# Patient Record
Sex: MALE | Race: WHITE | NOT HISPANIC OR LATINO | Employment: OTHER | URBAN - METROPOLITAN AREA
[De-identification: names, ages, dates, MRNs, and addresses within clinical notes are randomized per-mention and may not be internally consistent; named-entity substitution may affect disease eponyms.]

---

## 2018-05-30 ENCOUNTER — APPOINTMENT (OUTPATIENT)
Dept: RADIOLOGY | Facility: CLINIC | Age: 83
End: 2018-05-30
Payer: COMMERCIAL

## 2018-05-30 VITALS — DIASTOLIC BLOOD PRESSURE: 63 MMHG | SYSTOLIC BLOOD PRESSURE: 114 MMHG | WEIGHT: 173 LBS | HEART RATE: 60 BPM

## 2018-05-30 DIAGNOSIS — M25.551 PAIN IN RIGHT HIP: ICD-10-CM

## 2018-05-30 DIAGNOSIS — M16.11 PRIMARY LOCALIZED OSTEOARTHRITIS OF RIGHT HIP: Primary | ICD-10-CM

## 2018-05-30 DIAGNOSIS — S76.011A HIP STRAIN, RIGHT, INITIAL ENCOUNTER: ICD-10-CM

## 2018-05-30 PROCEDURE — 99203 OFFICE O/P NEW LOW 30 MIN: CPT | Performed by: ORTHOPAEDIC SURGERY

## 2018-05-30 PROCEDURE — 73502 X-RAY EXAM HIP UNI 2-3 VIEWS: CPT

## 2018-05-30 RX ORDER — CLOPIDOGREL BISULFATE 75 MG/1
TABLET ORAL EVERY 24 HOURS
COMMUNITY

## 2018-05-30 RX ORDER — METOPROLOL SUCCINATE 25 MG/1
TABLET, EXTENDED RELEASE ORAL EVERY 24 HOURS
COMMUNITY

## 2018-05-30 RX ORDER — ATORVASTATIN CALCIUM 40 MG/1
40 TABLET, FILM COATED ORAL
COMMUNITY

## 2018-05-30 NOTE — PROGRESS NOTES
Assessment:     1  Primary localized osteoarthritis of right hip    2  Hip strain, right, initial encounter        Plan:     Problem List Items Addressed This Visit        Musculoskeletal and Integument    Primary localized osteoarthritis of right hip - Primary    Relevant Orders    XR hip/pelv 2-3 vws right if performed    Ambulatory referral to Physical Therapy    Hip strain, right, initial encounter    Relevant Orders    Ambulatory referral to Physical Therapy          The patient was seen and examined by Dr Britt Menendez and myself  Findings consistent with right hip osteoarthritis and strain  Findings and treatment options were discussed the patient  Patient has no pain at this time, but he was given a prescription for physical therapy to use if pain returns to help with muscle strengthening  He is to take NSAIDs if the pain returns as well  Follow-up as needed if pain returns  All questions were answered to patient's satisfaction  Subjective:     Patient ID: Lynne Walton is a 80 y o  male  Chief Complaint: This is an 66-year-old male complaining of right hip pain for the past 3 months  He denies any injury or change in activities  The pain started gradually  He feels most of the pain when he is walking for prolonged periods of time  He denies any stiffness  The pain was localized over the lateral aspect of his hip and right lumbar spine  He denies any groin pain or pain, numbness or tingling radiating down his right lower extremity  The pain resolved about 2 weeks ago  He has a history of a different type of right hip pain in August 2017  He was seen at the [facility] an MRI of the right hip was done that showed a tendon tear  He did not bring the MRI with him to the appointment today  No recent treatment  Patient intake form was reviewed today         Allergy:  No Known Allergies  Medications:  all current active meds have been reviewed  Past Medical History:  Past Medical History:   Diagnosis Date    Hypertension      Past Surgical History:  History reviewed  No pertinent surgical history  Family History:  Family History   Problem Relation Age of Onset    No Known Problems Mother     No Known Problems Father      Social History:  History   Alcohol use Not on file     History   Drug use: Unknown     History   Smoking Status    Never Smoker   Smokeless Tobacco    Never Used     Review of Systems   Constitutional: Negative  HENT: Negative  Eyes: Negative  Respiratory: Negative  Cardiovascular: Negative  Gastrointestinal: Negative  Endocrine: Negative  Genitourinary: Negative  Musculoskeletal: Negative  Skin: Negative  Allergic/Immunologic: Negative  Neurological: Negative  Hematological: Negative  Psychiatric/Behavioral: Positive for dysphoric mood  The patient is nervous/anxious  Objective:  BP Readings from Last 1 Encounters:   05/30/18 114/63      Wt Readings from Last 1 Encounters:   05/30/18 78 5 kg (173 lb)      BMI:   There is no height or weight on file to calculate BMI  BSA:   There is no height or weight on file to calculate BSA  Physical Exam   Constitutional: He is oriented to person, place, and time  He appears well-developed  HENT:   Head: Normocephalic and atraumatic  Eyes: EOM are normal  Pupils are equal, round, and reactive to light  Neck: Neck supple  Neurological: He is alert and oriented to person, place, and time  Skin: Skin is warm  Psychiatric: He has a normal mood and affect  Nursing note and vitals reviewed  Right Hip Exam     Tenderness   The patient is experiencing no tenderness  Range of Motion   The patient has normal right hip ROM  Muscle Strength   The patient has normal right hip strength      Tests   NENA: negative  John: negative    Other   Erythema: absent  Scars: absent  Sensation: normal  Pulse: present    Comments:  No pain with active or passive range of motion  No pain with resisted strength testing      Left Hip Exam   Left hip exam is normal       Back Exam     Tenderness   The patient is experiencing no tenderness  Range of Motion   The patient has normal back ROM  Muscle Strength   The patient has normal back strength  Tests   Straight leg raise right: negative  Straight leg raise left: negative    Other   Toe Walk: normal  Heel Walk: normal  Sensation: normal  Gait: normal   Erythema: no back redness  Scars: absent            X-rays of the right hip reveal mild osteoarthritis

## 2019-09-25 ENCOUNTER — APPOINTMENT (EMERGENCY)
Dept: RADIOLOGY | Facility: HOSPITAL | Age: 84
End: 2019-09-25
Payer: COMMERCIAL

## 2019-09-25 ENCOUNTER — HOSPITAL ENCOUNTER (EMERGENCY)
Facility: HOSPITAL | Age: 84
Discharge: HOME | End: 2019-09-25
Attending: EMERGENCY MEDICINE
Payer: COMMERCIAL

## 2019-09-25 VITALS
SYSTOLIC BLOOD PRESSURE: 132 MMHG | WEIGHT: 167 LBS | BODY MASS INDEX: 25.31 KG/M2 | HEIGHT: 68 IN | DIASTOLIC BLOOD PRESSURE: 70 MMHG | TEMPERATURE: 97.6 F | RESPIRATION RATE: 20 BRPM | HEART RATE: 47 BPM | OXYGEN SATURATION: 96 %

## 2019-09-25 DIAGNOSIS — R07.9 CHEST PAIN: Primary | ICD-10-CM

## 2019-09-25 LAB
ALBUMIN SERPL BCP-MCNC: 3.3 G/DL (ref 3.5–5)
ALP SERPL-CCNC: 148 U/L (ref 46–116)
ALT SERPL W P-5'-P-CCNC: 14 U/L (ref 12–78)
ANION GAP SERPL CALCULATED.3IONS-SCNC: 7 MMOL/L (ref 4–13)
APTT PPP: 27 SECONDS (ref 23–37)
AST SERPL W P-5'-P-CCNC: 27 U/L (ref 5–45)
ATRIAL RATE: 56 BPM
BASOPHILS # BLD AUTO: 0.01 THOUSANDS/ΜL (ref 0–0.1)
BASOPHILS NFR BLD AUTO: 0 % (ref 0–1)
BILIRUB SERPL-MCNC: 0.9 MG/DL (ref 0.2–1)
BUN SERPL-MCNC: 8 MG/DL (ref 5–25)
CALCIUM SERPL-MCNC: 9.6 MG/DL (ref 8.3–10.1)
CHLORIDE SERPL-SCNC: 106 MMOL/L (ref 100–108)
CO2 SERPL-SCNC: 32 MMOL/L (ref 21–32)
CREAT SERPL-MCNC: 0.8 MG/DL (ref 0.6–1.3)
EOSINOPHIL # BLD AUTO: 0.06 THOUSAND/ΜL (ref 0–0.61)
EOSINOPHIL NFR BLD AUTO: 1 % (ref 0–6)
ERYTHROCYTE [DISTWIDTH] IN BLOOD BY AUTOMATED COUNT: 12.1 % (ref 11.6–15.1)
GFR SERPL CREATININE-BSD FRML MDRD: 81 ML/MIN/1.73SQ M
GLUCOSE SERPL-MCNC: 135 MG/DL (ref 65–140)
HCT VFR BLD AUTO: 43.1 % (ref 36.5–49.3)
HGB BLD-MCNC: 14.2 G/DL (ref 12–17)
IMM GRANULOCYTES # BLD AUTO: 0.02 THOUSAND/UL (ref 0–0.2)
IMM GRANULOCYTES NFR BLD AUTO: 0 % (ref 0–2)
INR PPP: 1.05 (ref 0.84–1.19)
LYMPHOCYTES # BLD AUTO: 1.71 THOUSANDS/ΜL (ref 0.6–4.47)
LYMPHOCYTES NFR BLD AUTO: 27 % (ref 14–44)
MCH RBC QN AUTO: 32.1 PG (ref 26.8–34.3)
MCHC RBC AUTO-ENTMCNC: 32.9 G/DL (ref 31.4–37.4)
MCV RBC AUTO: 97 FL (ref 82–98)
MONOCYTES # BLD AUTO: 0.51 THOUSAND/ΜL (ref 0.17–1.22)
MONOCYTES NFR BLD AUTO: 8 % (ref 4–12)
NEUTROPHILS # BLD AUTO: 4.13 THOUSANDS/ΜL (ref 1.85–7.62)
NEUTS SEG NFR BLD AUTO: 64 % (ref 43–75)
NRBC BLD AUTO-RTO: 0 /100 WBCS
P AXIS: 74 DEGREES
PLATELET # BLD AUTO: 132 THOUSANDS/UL (ref 149–390)
PMV BLD AUTO: 10 FL (ref 8.9–12.7)
POTASSIUM SERPL-SCNC: 3.5 MMOL/L (ref 3.5–5.3)
PR INTERVAL: 158 MS
PROT SERPL-MCNC: 6.9 G/DL (ref 6.4–8.2)
PROTHROMBIN TIME: 13.4 SECONDS (ref 11.6–14.5)
QRS AXIS: 65 DEGREES
QRSD INTERVAL: 142 MS
QT INTERVAL: 470 MS
QTC INTERVAL: 453 MS
RBC # BLD AUTO: 4.43 MILLION/UL (ref 3.88–5.62)
SODIUM SERPL-SCNC: 145 MMOL/L (ref 136–145)
T WAVE AXIS: 77 DEGREES
TROPONIN I SERPL-MCNC: 0.02 NG/ML
TROPONIN I SERPL-MCNC: 0.02 NG/ML
VENTRICULAR RATE: 56 BPM
WBC # BLD AUTO: 6.44 THOUSAND/UL (ref 4.31–10.16)

## 2019-09-25 PROCEDURE — 85610 PROTHROMBIN TIME: CPT | Performed by: EMERGENCY MEDICINE

## 2019-09-25 PROCEDURE — 84484 ASSAY OF TROPONIN QUANT: CPT | Performed by: EMERGENCY MEDICINE

## 2019-09-25 PROCEDURE — 93005 ELECTROCARDIOGRAM TRACING: CPT

## 2019-09-25 PROCEDURE — 85730 THROMBOPLASTIN TIME PARTIAL: CPT | Performed by: EMERGENCY MEDICINE

## 2019-09-25 PROCEDURE — 36415 COLL VENOUS BLD VENIPUNCTURE: CPT | Performed by: EMERGENCY MEDICINE

## 2019-09-25 PROCEDURE — 99285 EMERGENCY DEPT VISIT HI MDM: CPT

## 2019-09-25 PROCEDURE — 93010 ELECTROCARDIOGRAM REPORT: CPT | Performed by: INTERNAL MEDICINE

## 2019-09-25 PROCEDURE — 71045 X-RAY EXAM CHEST 1 VIEW: CPT

## 2019-09-25 PROCEDURE — 80053 COMPREHEN METABOLIC PANEL: CPT | Performed by: EMERGENCY MEDICINE

## 2019-09-25 PROCEDURE — 85025 COMPLETE CBC W/AUTO DIFF WBC: CPT | Performed by: EMERGENCY MEDICINE

## 2019-09-25 PROCEDURE — 99285 EMERGENCY DEPT VISIT HI MDM: CPT | Performed by: EMERGENCY MEDICINE

## 2019-09-25 RX ORDER — ABIRATERONE ACETATE 250 MG/1
1000 TABLET ORAL DAILY
COMMUNITY
Start: 2018-11-05

## 2019-09-25 RX ORDER — ACETAMINOPHEN 325 MG/1
650 TABLET ORAL ONCE
Status: COMPLETED | OUTPATIENT
Start: 2019-09-25 | End: 2019-09-25

## 2019-09-25 RX ORDER — LIDOCAINE HYDROCHLORIDE 20 MG/ML
15 SOLUTION OROPHARYNGEAL ONCE
Status: COMPLETED | OUTPATIENT
Start: 2019-09-25 | End: 2019-09-25

## 2019-09-25 RX ORDER — ASPIRIN 81 MG/1
81 TABLET, CHEWABLE ORAL ONCE
Status: COMPLETED | OUTPATIENT
Start: 2019-09-25 | End: 2019-09-25

## 2019-09-25 RX ORDER — PREDNISONE 1 MG/1
TABLET ORAL
COMMUNITY
Start: 2019-06-04

## 2019-09-25 RX ADMIN — ACETAMINOPHEN 650 MG: 325 TABLET, FILM COATED ORAL at 11:19

## 2019-09-25 RX ADMIN — ASPIRIN 81 MG 81 MG: 81 TABLET ORAL at 09:27

## 2019-09-25 RX ADMIN — LIDOCAINE HYDROCHLORIDE 15 ML: 20 SOLUTION ORAL; TOPICAL at 11:19

## 2019-09-25 NOTE — ED NOTES
Pt wife came to nursing stating and asked about more blood work and medications and updated that blood work and that we would be in to give meds     Sage Lopez RN  09/25/19 3750

## 2019-09-25 NOTE — ED NOTES
Pt updated on plan of care, wife given ginger ale per request  No further needs at this time  VS stable   Patient aware of delta troponin at 12pm     Adelina Amor RN  09/25/19 5264

## 2019-09-25 NOTE — ED NOTES
XR at bedside     Douglas Aiken, [facility]  09/25/19 3984

## 2019-09-25 NOTE — ED PROVIDER NOTES
History  Chief Complaint   Patient presents with    Chest Pain     pt presents to ED c/o chest pain in middle of his chest that began last night  This is an 26-year-old male who presents for evaluation substernal chest achy pain that awoke him from sleep at midnight denies any nausea vomiting or shortness of breath he does have a prior history coronary artery disease and stent placement in addition to hypertension and hypercholesterolemia he is a nonsmoker  He was recently started on a steroid for right-sided hip pain  Denies any radiation of the pain no relieving or exacerbating causes      History provided by:  Patient and spouse  Chest Pain   Pain location:  Substernal area  Pain quality: aching    Pain radiates to:  Does not radiate  Pain radiates to the back: no    Pain severity:  Moderate  Onset quality:  Unable to specify  Duration:  9 hours  Timing:  Constant  Progression:  Unchanged  Chronicity:  New  Context: at rest    Relieved by:  Nothing  Associated symptoms: no abdominal pain, no nausea, no shortness of breath and not vomiting    Risk factors: coronary artery disease, high cholesterol and hypertension    Risk factors: no diabetes mellitus and no smoking        Prior to Admission Medications   Prescriptions Last Dose Informant Patient Reported? Taking? Omega-3 Fatty Acids (FISH OIL PO)   Yes No   Sig: Take 2 g by mouth   abiraterone (ZYTIGA) 250 mg tablet   Yes Yes   Sig: Take 1,000 mg by mouth daily   atorvastatin (LIPITOR) 40 mg tablet   Yes No   Sig: Take 40 mg by mouth   clopidogrel (PLAVIX) 75 mg tablet   Yes No   Sig: every 24 hours   metoprolol succinate (TOPROL-XL) 25 mg 24 hr tablet   Yes No   Sig: every 24 hours   predniSONE 5 mg tablet   Yes Yes   Sig: Take 5mg by mouth twice daily      Facility-Administered Medications: None       Past Medical History:   Diagnosis Date    Hypertension        History reviewed  No pertinent surgical history      Family History   Problem Relation Age of Onset    No Known Problems Mother     No Known Problems Father      I have reviewed and agree with the history as documented  Social History     Tobacco Use    Smoking status: Never Smoker    Smokeless tobacco: Never Used   Substance Use Topics    Alcohol use: Yes    Drug use: Not on file        Review of Systems   Respiratory: Negative for shortness of breath  Cardiovascular: Positive for chest pain  Gastrointestinal: Negative for abdominal pain, nausea and vomiting  Musculoskeletal:        Right hip pain   All other systems reviewed and are negative  Physical Exam  Physical Exam   Constitutional: He is oriented to person, place, and time  He appears well-developed and well-nourished  No distress  HENT:   Head: Normocephalic and atraumatic  Right Ear: External ear normal    Left Ear: External ear normal    Nose: Nose normal    Mouth/Throat: Oropharynx is clear and moist    Eyes: Pupils are equal, round, and reactive to light  EOM are normal  Right eye exhibits no discharge  Left eye exhibits no discharge  No scleral icterus  Neck: Neck supple  No JVD present  No tracheal deviation present  Cardiovascular: Normal rate, regular rhythm and intact distal pulses  Exam reveals no gallop and no friction rub  No murmur heard  Pulmonary/Chest: Effort normal and breath sounds normal  No stridor  No respiratory distress  He has no wheezes  He has no rales  He exhibits tenderness  Abdominal: Soft  Bowel sounds are normal  He exhibits no distension  There is no tenderness  There is no guarding  Musculoskeletal: He exhibits tenderness  He exhibits no edema or deformity  Right hip pain with movement ambulatory with a limp   Neurological: He is alert and oriented to person, place, and time  No cranial nerve deficit or sensory deficit  Coordination normal    Skin: Skin is warm and dry  No rash noted  He is not diaphoretic  Psychiatric: He has a normal mood and affect   His behavior is normal  Thought content normal    Nursing note and vitals reviewed        Vital Signs  ED Triage Vitals [09/25/19 0914]   Temperature Pulse Respirations Blood Pressure SpO2   97 6 °F (36 4 °C) 66 17 132/63 99 %      Temp Source Heart Rate Source Patient Position - Orthostatic VS BP Location FiO2 (%)   Temporal Monitor Lying Right arm --      Pain Score       5           Vitals:    09/25/19 1045 09/25/19 1100 09/25/19 1130 09/25/19 1215   BP: 126/55 134/63 140/63 121/60   Pulse: (!) 52 (!) 53 (!) 52 (!) 47   Patient Position - Orthostatic VS:             Visual Acuity      ED Medications  Medications   aspirin chewable tablet 81 mg (81 mg Oral Given 9/25/19 0927)   Lidocaine Viscous HCl (XYLOCAINE) 2 % mucosal solution 15 mL (15 mL Swish & Swallow Given 9/25/19 1119)   acetaminophen (TYLENOL) tablet 650 mg (650 mg Oral Given 9/25/19 1119)       Diagnostic Studies  Results Reviewed     Procedure Component Value Units Date/Time    Troponin I [[id number]]  (Normal) Collected:  09/25/19 1210    Lab Status:  Final result Specimen:  Blood from Arm, Left Updated:  09/25/19 1301     Troponin I 0 02 ng/mL     Comprehensive metabolic panel [[id number]]  (Abnormal) Collected:  09/25/19 0928    Lab Status:  Final result Specimen:  Blood from Arm, Left Updated:  09/25/19 1029     Sodium 145 mmol/L      Potassium 3 5 mmol/L      Chloride 106 mmol/L      CO2 32 mmol/L      ANION GAP 7 mmol/L      BUN 8 mg/dL      Creatinine 0 80 mg/dL      Glucose 135 mg/dL      Calcium 9 6 mg/dL      AST 27 U/L      ALT 14 U/L      Alkaline Phosphatase 148 U/L      Total Protein 6 9 g/dL      Albumin 3 3 g/dL      Total Bilirubin 0 90 mg/dL      eGFR 81 ml/min/1 73sq m     Narrative:       [place] guidelines for Chronic Kidney Disease (CKD):     Stage 1 with normal or high GFR (GFR > 90 mL/min/1 73 square meters)    Stage 2 Mild CKD (GFR = 60-89 mL/min/1 73 square meters)    Stage 3A Moderate CKD (GFR = 45-59 mL/min/1 73 square meters)    Stage 3B Moderate CKD (GFR = 30-44 mL/min/1 73 square meters)    Stage 4 Severe CKD (GFR = 15-29 mL/min/1 73 square meters)    Stage 5 End Stage CKD (GFR <15 mL/min/1 73 square meters)  Note: GFR calculation is accurate only with a steady state creatinine    Troponin I [[id number]]  (Normal) Collected:  09/25/19 0928    Lab Status:  Final result Specimen:  Blood from Arm, Left Updated:  09/25/19 1027     Troponin I 0 02 ng/mL     Protime-INR [[id number]]  (Normal) Collected:  09/25/19 0928    Lab Status:  Final result Specimen:  Blood from Arm, Left Updated:  09/25/19 1013     Protime 13 4 seconds      INR 1 05    APTT [[id number]]  (Normal) Collected:  09/25/19 0928    Lab Status:  Final result Specimen:  Blood from Arm, Left Updated:  09/25/19 1013     PTT 27 seconds     CBC and differential [[id number]]  (Abnormal) Collected:  09/25/19 0928    Lab Status:  Final result Specimen:  Blood from Arm, Left Updated:  09/25/19 1011     WBC 6 44 Thousand/uL      RBC 4 43 Million/uL      Hemoglobin 14 2 g/dL      Hematocrit 43 1 %      MCV 97 fL      MCH 32 1 pg      MCHC 32 9 g/dL      RDW 12 1 %      MPV 10 0 fL      Platelets 284 Thousands/uL      nRBC 0 /100 WBCs      Neutrophils Relative 64 %      Immat GRANS % 0 %      Lymphocytes Relative 27 %      Monocytes Relative 8 %      Eosinophils Relative 1 %      Basophils Relative 0 %      Neutrophils Absolute 4 13 Thousands/µL      Immature Grans Absolute 0 02 Thousand/uL      Lymphocytes Absolute 1 71 Thousands/µL      Monocytes Absolute 0 51 Thousand/µL      Eosinophils Absolute 0 06 Thousand/µL      Basophils Absolute 0 01 Thousands/µL                  XR chest 1 view portable   Final Result by Sammy Fay MD (09/25 1030)      No acute cardiopulmonary disease              Workstation performed: [id number]                    Procedures  ECG 12 Lead Documentation Only  Date/Time: 9/25/2019 9:15 AM  Performed by: Katiana Higginbotham DO  Authorized by: Linnette Peng,      ECG reviewed by me, the ED Provider: yes    Patient location:  ED  Rate:     ECG rate:  56    ECG rate assessment: bradycardic    Rhythm:     Rhythm: sinus rhythm    Conduction:     Conduction: abnormal      Abnormal conduction: complete LBBB    ST segments:     ST segments:  Non-specific           ED Course  ED Course as of Sep 25 1309   Wed Sep 25, 2019   1103 Discussed findings of blood work and chest x-ray EKG with patient and his wife  Wife does have a no book listed as a prior diagnosis left bundle branch block at this time patient does not really want to stay in the hospital he would like to do a 3 hour repeat troponin and follow up with his cardiologist and have an outpatient stress test   I did stress the fact that normally we would keep him in the hospital for something like this but he does not want to stay and instructed him on returning if any of his symptoms worsen        1302 Repeat troponin is 0 02 patient feels much better after viscous lidocaine and Tylenol he will follow up for an outpatient stress test would still rather not stay in the hospital            HEART Risk Score      Most Recent Value   History  1 Filed at: 09/25/2019 1049   ECG  1 Filed at: 09/25/2019 1049   Age  2 Filed at: 09/25/2019 1049   Risk Factors  2 Filed at: 09/25/2019 1049   Troponin  0 Filed at: 09/25/2019 1049   Heart Score Risk Calculator   History  1 Filed at: 09/25/2019 1049   ECG  1 Filed at: 09/25/2019 1049   Age  2 Filed at: 09/25/2019 1049   Risk Factors  2 Filed at: 09/25/2019 1049   Troponin  0 Filed at: 09/25/2019 1049   HEART Score  6 Filed at: 09/25/2019 1049   HEART Score  6 Filed at: 09/25/2019 1049                            MDM  Number of Diagnoses or Management Options  Diagnosis management comments: Chest pain differential includes acute coronary syndrome versus esophagitis musculoskeletal pain pneumothorax or pneumonia will check labs EKG and chest x-ray for further evaluation Amount and/or Complexity of Data Reviewed  Clinical lab tests: ordered  Tests in the radiology section of CPT®: ordered        Disposition  Final diagnoses:   Chest pain     Time reflects when diagnosis was documented in both MDM as applicable and the Disposition within this note     Time User Action Codes Description Comment    9/25/2019  1:05 PM Anabel Mariee [R07 [id number]] Chest pain       ED Disposition     ED Disposition Condition Date/Time Comment    Discharge Stable Wed Sep 25, 2019  1:05 PM Cameron Shin discharge to home/self care  Follow-up Information     Follow up With Specialties Details Why [address] [place] [address] [address]  Also with your Cardiology physician from [address] [place] [address]  [phone]            Patient's Medications   Discharge Prescriptions    No medications on file     Outpatient Discharge Orders   NM myocardial perfusion spect (rx stress and/or rest)   Standing Status: Future Standing Exp   Date: 09/25/23       ED Provider  Electronically Signed by           Mendel Alcide, DO  09/25/19 2096

## 2019-10-02 ENCOUNTER — APPOINTMENT (OUTPATIENT)
Dept: RADIOLOGY | Facility: CLINIC | Age: 84
End: 2019-10-02
Payer: COMMERCIAL

## 2019-10-02 VITALS
HEIGHT: 68 IN | BODY MASS INDEX: 25.31 KG/M2 | SYSTOLIC BLOOD PRESSURE: 118 MMHG | DIASTOLIC BLOOD PRESSURE: 78 MMHG | WEIGHT: 167 LBS | HEART RATE: 72 BPM

## 2019-10-02 DIAGNOSIS — M25.551 PAIN IN RIGHT HIP: Primary | ICD-10-CM

## 2019-10-02 DIAGNOSIS — M25.551 PAIN IN RIGHT HIP: ICD-10-CM

## 2019-10-02 PROCEDURE — 73502 X-RAY EXAM HIP UNI 2-3 VIEWS: CPT

## 2019-10-02 PROCEDURE — 20610 DRAIN/INJ JOINT/BURSA W/O US: CPT | Performed by: PHYSICIAN ASSISTANT

## 2019-10-02 PROCEDURE — 99213 OFFICE O/P EST LOW 20 MIN: CPT | Performed by: PHYSICIAN ASSISTANT

## 2019-10-02 RX ORDER — BETAMETHASONE SODIUM PHOSPHATE AND BETAMETHASONE ACETATE 3; 3 MG/ML; MG/ML
6 INJECTION, SUSPENSION INTRA-ARTICULAR; INTRALESIONAL; INTRAMUSCULAR; SOFT TISSUE
Status: COMPLETED | OUTPATIENT
Start: 2019-10-02 | End: 2019-10-02

## 2019-10-02 RX ORDER — LIDOCAINE HYDROCHLORIDE 10 MG/ML
7 INJECTION, SOLUTION EPIDURAL; INFILTRATION; INTRACAUDAL; PERINEURAL
Status: COMPLETED | OUTPATIENT
Start: 2019-10-02 | End: 2019-10-02

## 2019-10-02 RX ADMIN — LIDOCAINE HYDROCHLORIDE 7 ML: 10 INJECTION, SOLUTION EPIDURAL; INFILTRATION; INTRACAUDAL; PERINEURAL at 11:50

## 2019-10-02 RX ADMIN — BETAMETHASONE SODIUM PHOSPHATE AND BETAMETHASONE ACETATE 6 MG: 3; 3 INJECTION, SUSPENSION INTRA-ARTICULAR; INTRALESIONAL; INTRAMUSCULAR; SOFT TISSUE at 11:50

## 2019-10-02 NOTE — ASSESSMENT & PLAN NOTE
Findings consistent with right hip pain, concern for progressive metastatic lesions to the right hip secondary to prostate cancer  Findings and treatment options were discussed with the patient and his wife  X-rays were reviewed with them  Recommend MRI of the right hip to further evaluate the joint  Patient has distant on receiving a greater trochanteric cortisone injection since he had relief with that 2 years ago  The injection was given today  Patient tolerated the procedure well  Advised to apply cold compress today  Follow-up with MRI results and Dr Gely Jones will make further treatment recommendations at that time  All questions were answered to patient's satisfaction

## 2019-10-02 NOTE — PROGRESS NOTES
Assessment:     1  Pain in right hip        Plan:  The patient was seen and examined by Dr Marco A Bush and myself  Problem List Items Addressed This Visit        Other    Pain in right hip - Primary     Findings consistent with right hip pain, concern for progressive metastatic lesions to the right hip secondary to prostate cancer  Findings and treatment options were discussed with the patient and his wife  X-rays were reviewed with them  Recommend MRI of the right hip to further evaluate the joint  Patient has distant on receiving a greater trochanteric cortisone injection since he had relief with that 2 years ago  The injection was given today  Patient tolerated the procedure well  Advised to apply cold compress today  Follow-up with MRI results and Dr Marco A Bush will make further treatment recommendations at that time  All questions were answered to patient's satisfaction  Relevant Medications    lidocaine (PF) (XYLOCAINE-MPF) 1 % injection 7 mL (Completed)    betamethasone acetate-betamethasone sodium phosphate (CELESTONE) injection 6 mg (Completed)    Other Relevant Orders    XR hip/pelv 2-3 vws right if performed    MRI hip right wo contrast    Large joint arthrocentesis: R greater trochanteric bursa (Completed)         Subjective:     Patient ID: Kenna Baldwin is a 80 y o  male  Chief Complaint: This is an 80-year-old white male complaining of progressively worsening right hip pain for the past 3 weeks  He denies any injury 3 weeks ago  He states the pain has gotten so severe that he needs to use a walker  The pain only occurs with weight-bearing  He points to the anterior and lateral aspect of his hip when he describes the pain  It is sharp in nature  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  Last CT scan and bone scan in June 2019 showed stable lesions in the pelvis    He has been treated for right hip pain in the past   He received a cortisone injection to the greater trochanteric bursa at [facility] about 2 years ago that provided some relief  He was seen by Dr Freedom Gutierrez last year who sent him for physical therapy for a hip strain  No recent treatment  Insert take    Allergy:  No Known Allergies  Medications:  all current active meds have been reviewed  Past Medical History:  Past Medical History:   Diagnosis Date    Hypertension      Past Surgical History:  History reviewed  No pertinent surgical history  Family History:  Family History   Problem Relation Age of Onset    No Known Problems Mother     No Known Problems Father      Social History:  Social History     Substance and Sexual Activity   Alcohol Use Yes     Social History     Substance and Sexual Activity   Drug Use Not on file     Social History     Tobacco Use   Smoking Status Never Smoker   Smokeless Tobacco Never Used     Review of Systems   Constitutional: Positive for appetite change and fatigue  HENT: Negative  Eyes: Negative  Respiratory: Negative  Cardiovascular: Negative  Gastrointestinal: Negative  Endocrine: Negative  Genitourinary: Negative  Musculoskeletal: Positive for arthralgias and gait problem (Using walker)  Skin: Negative  Allergic/Immunologic: Negative  Hematological: Negative  Psychiatric/Behavioral: Positive for dysphoric mood  Objective:  BP Readings from Last 1 Encounters:   10/02/19 118/78      Wt Readings from Last 1 Encounters:   10/02/19 75 8 kg (167 lb)      BMI:   Estimated body mass index is 25 77 kg/m² as calculated from the following:    Height as of this encounter: 5' 7 5" (1 715 m)  Weight as of this encounter: 75 8 kg (167 lb)  BSA:   Estimated body surface area is 1 88 meters squared as calculated from the following:    Height as of this encounter: 5' 7 5" (1 715 m)  Weight as of this encounter: 75 8 kg (167 lb)  Physical Exam   Constitutional: He is oriented to person, place, and time  He appears well-developed     HENT: Head: Normocephalic and atraumatic  Eyes: Conjunctivae and EOM are normal    Neck: Neck supple  Neurological: He is alert and oriented to person, place, and time  Skin: Skin is warm  Psychiatric: He has a normal mood and affect  Nursing note and vitals reviewed  Right Hip Exam     Tenderness   The patient is experiencing no tenderness  Range of Motion   The patient has normal right hip ROM  Right hip external rotation: Pain  Muscle Strength   The patient has normal right hip strength  Tests   NENA: negative  John: negative    Other   Erythema: absent  Scars: absent  Sensation: normal  Pulse: present            I have personally reviewed pertinent films in PACS and my interpretation is X-rays of the right hip reveal a CAM lesion with mild osteoarthritis  No acute fracture seen       Large joint arthrocentesis: R greater trochanteric bursa  Date/Time: 10/2/2019 11:50 AM  Consent given by: patient  Site marked: site marked  Timeout: Immediately prior to procedure a time out was called to verify the correct patient, procedure, equipment, support staff and site/side marked as required   Supporting Documentation  Indications: pain   Procedure Details  Location: hip - R greater trochanteric bursa  Preparation: Patient was prepped and draped in the usual sterile fashion  Needle size: 22 G  Ultrasound guidance: no  Approach: lateral  Medications administered: 6 mg betamethasone acetate-betamethasone sodium phosphate 6 (3-3) mg/mL; 7 mL lidocaine (PF) 1 %    Patient tolerance: patient tolerated the procedure well with no immediate complications  Dressing:  Sterile dressing applied

## 2019-10-10 ENCOUNTER — HOSPITAL ENCOUNTER (OUTPATIENT)
Dept: MRI IMAGING | Facility: HOSPITAL | Age: 84
Discharge: HOME | End: 2019-10-10
Payer: COMMERCIAL

## 2019-10-10 DIAGNOSIS — M25.551 PAIN IN RIGHT HIP: ICD-10-CM

## 2019-10-10 PROCEDURE — 73721 MRI JNT OF LWR EXTRE W/O DYE: CPT

## 2019-10-15 ENCOUNTER — OFFICE VISIT (OUTPATIENT)
Dept: OBGYN CLINIC | Facility: CLINIC | Age: 84
End: 2019-10-15
Payer: COMMERCIAL

## 2019-10-15 VITALS
HEIGHT: 67 IN | HEART RATE: 80 BPM | WEIGHT: 165 LBS | BODY MASS INDEX: 25.9 KG/M2 | SYSTOLIC BLOOD PRESSURE: 116 MMHG | DIASTOLIC BLOOD PRESSURE: 70 MMHG

## 2019-10-15 DIAGNOSIS — C79.51 METASTATIC CANCER TO BONE (HCC): Primary | ICD-10-CM

## 2019-10-15 PROCEDURE — 99213 OFFICE O/P EST LOW 20 MIN: CPT | Performed by: ORTHOPAEDIC SURGERY

## 2019-10-15 RX ORDER — OXYCODONE HYDROCHLORIDE AND ACETAMINOPHEN 5; 325 MG/1; MG/1
TABLET ORAL
Qty: 45 TABLET | Refills: 0 | Status: SHIPPED | OUTPATIENT
Start: 2019-10-15

## 2019-10-15 NOTE — PROGRESS NOTES
Assessment:     1  Metastatic cancer to bone [facility])        Plan:     Problem List Items Addressed This Visit        Musculoskeletal and Integument    Metastatic cancer to bone [facility]) - Primary    Relevant Medications    oxyCODONE-acetaminophen (PERCOCET) 5-325 mg per tablet          Findings consistent with metastatic cancer to bone  Discussed findings and treatment options with the patient  I reviewed patient's pelvis MRI with him and his wife  I recommend patient to revisit his oncologist for further treatment recommendation  He will continue to use walker  He may have to see a orthopedic oncologist for further treatment recommendation if he continued to have pain  All patient's questions were answered to his satisfaction  This note is created using dictation transcription  It may contain typographical errors, grammatical errors, improperly dictated words, background noise and other errors  Subjective:     Patient ID: Richy Cortes is a 80 y o  male  Chief Complaint:  79-year-old gentleman returns today follow-up right hip pain  He has been using walker and ambulating  Patient is requesting pain medication  He continued to complain of pain over his right hip  He is here with his wife to review his MRI  Allergy:  No Known Allergies  Medications:  all current active meds have been reviewed  Past Medical History:  Past Medical History:   Diagnosis Date    Hypertension      Past Surgical History:  History reviewed  No pertinent surgical history  Family History:  Family History   Problem Relation Age of Onset    No Known Problems Mother     No Known Problems Father      Social History:  Social History     Substance and Sexual Activity   Alcohol Use Yes     Social History     Substance and Sexual Activity   Drug Use Not on file     Social History     Tobacco Use   Smoking Status Never Smoker   Smokeless Tobacco Never Used     Review of Systems   Constitutional: Negative  HENT: Negative      Eyes: Negative  Respiratory: Negative  Cardiovascular: Negative  Gastrointestinal: Negative  Endocrine: Negative  Genitourinary: Negative  Musculoskeletal: Positive for arthralgias (Right hip) and gait problem (Antalgic and using a walker)  Skin: Negative  Hematological: Negative  Psychiatric/Behavioral: Negative  Objective:  BP Readings from Last 1 Encounters:   10/15/19 116/70      Wt Readings from Last 1 Encounters:   10/15/19 74 8 kg (165 lb)      BMI:   Estimated body mass index is 25 84 kg/m² as calculated from the following:    Height as of this encounter: 5' 7" (1 702 m)  Weight as of this encounter: 74 8 kg (165 lb)  BSA:   Estimated body surface area is 1 86 meters squared as calculated from the following:    Height as of this encounter: 5' 7" (1 702 m)  Weight as of this encounter: 74 8 kg (165 lb)  Physical Exam   Constitutional: He is oriented to person, place, and time  He appears well-developed  HENT:   Head: Normocephalic and atraumatic  Eyes: Conjunctivae and EOM are normal    Neck: Neck supple  Pulmonary/Chest: Effort normal    Neurological: He is alert and oriented to person, place, and time  Skin: Skin is warm  Psychiatric: He has a normal mood and affect  Nursing note and vitals reviewed  Right Hip Exam     Tenderness   The patient is experiencing no tenderness  Range of Motion   The patient has normal right hip ROM  Muscle Strength   The patient has normal right hip strength  Other   Erythema: absent  Sensation: normal  Pulse: present            I have personally reviewed pertinent films in PACS and my interpretation is MRI of the pelvis show increase signal activity over the entire right iliac bone  They also metastatic lesions in the lumbar spine

## 2019-10-18 ENCOUNTER — TELEPHONE (OUTPATIENT)
Dept: OBGYN CLINIC | Facility: HOSPITAL | Age: 84
End: 2019-10-18

## 2019-10-18 NOTE — TELEPHONE ENCOUNTER
Ivan Expose    Pharmacy call stating RX may be too strong because he never has taken an opoid before  Please return call   [facility], [facility] [phone]    [facility]   Medication   oxyCODONE-acetaminophen (PERCOCET) 5-325 mg per tablet [5940]   oxyCODONE-acetaminophen (PERCOCET) 5-325 mg per tablet [[id number]]     Order Details   Dose, Route, Frequency: As Directed    Dispense Quantity: 45 tablet Refills: 0 Fills remaining: --           Si-2 tabs every 4-6 hours as needed

## 2019-10-18 NOTE — TELEPHONE ENCOUNTER
I spoke with Mago N Juan Stevenson and they state that they are showing that he has never taken opioid pain med before  Suggesting change to 1 tab every 4hrs vs 1-2 tabs every 4-6  Please advise if okay to change sig?

## 2019-10-29 ENCOUNTER — APPOINTMENT (EMERGENCY)
Dept: CT IMAGING | Facility: HOSPITAL | Age: 84
End: 2019-10-29
Payer: COMMERCIAL

## 2019-10-29 ENCOUNTER — APPOINTMENT (EMERGENCY)
Dept: RADIOLOGY | Facility: HOSPITAL | Age: 84
End: 2019-10-29
Payer: COMMERCIAL

## 2019-10-29 ENCOUNTER — HOSPITAL ENCOUNTER (EMERGENCY)
Facility: HOSPITAL | Age: 84
Discharge: HOME | End: 2019-10-29
Attending: EMERGENCY MEDICINE | Admitting: EMERGENCY MEDICINE
Payer: COMMERCIAL

## 2019-10-29 VITALS
BODY MASS INDEX: 25.88 KG/M2 | WEIGHT: 164.9 LBS | DIASTOLIC BLOOD PRESSURE: 70 MMHG | HEART RATE: 63 BPM | OXYGEN SATURATION: 96 % | HEIGHT: 67 IN | RESPIRATION RATE: 17 BRPM | TEMPERATURE: 97.1 F | SYSTOLIC BLOOD PRESSURE: 159 MMHG

## 2019-10-29 DIAGNOSIS — R07.89 RIGHT-SIDED CHEST WALL PAIN: Primary | ICD-10-CM

## 2019-10-29 LAB
ALBUMIN SERPL BCP-MCNC: 3 G/DL (ref 3.5–5)
ALP SERPL-CCNC: 206 U/L (ref 46–116)
ALT SERPL W P-5'-P-CCNC: 15 U/L (ref 12–78)
ANION GAP SERPL CALCULATED.3IONS-SCNC: 5 MMOL/L (ref 4–13)
APTT PPP: 28 SECONDS (ref 23–37)
AST SERPL W P-5'-P-CCNC: 32 U/L (ref 5–45)
ATRIAL RATE: 63 BPM
BASOPHILS # BLD AUTO: 0.02 THOUSANDS/ΜL (ref 0–0.1)
BASOPHILS NFR BLD AUTO: 0 % (ref 0–1)
BILIRUB SERPL-MCNC: 1 MG/DL (ref 0.2–1)
BUN SERPL-MCNC: 14 MG/DL (ref 5–25)
CALCIUM SERPL-MCNC: 9.1 MG/DL (ref 8.3–10.1)
CHLORIDE SERPL-SCNC: 104 MMOL/L (ref 100–108)
CO2 SERPL-SCNC: 30 MMOL/L (ref 21–32)
CREAT SERPL-MCNC: 0.78 MG/DL (ref 0.6–1.3)
D DIMER PPP FEU-MCNC: 1.73 UG/ML FEU
EOSINOPHIL # BLD AUTO: 0.07 THOUSAND/ΜL (ref 0–0.61)
EOSINOPHIL NFR BLD AUTO: 1 % (ref 0–6)
ERYTHROCYTE [DISTWIDTH] IN BLOOD BY AUTOMATED COUNT: 12.8 % (ref 11.6–15.1)
GFR SERPL CREATININE-BSD FRML MDRD: 82 ML/MIN/1.73SQ M
GLUCOSE SERPL-MCNC: 105 MG/DL (ref 65–140)
HCT VFR BLD AUTO: 43.4 % (ref 36.5–49.3)
HGB BLD-MCNC: 14.1 G/DL (ref 12–17)
IMM GRANULOCYTES # BLD AUTO: 0.04 THOUSAND/UL (ref 0–0.2)
IMM GRANULOCYTES NFR BLD AUTO: 1 % (ref 0–2)
INR PPP: 1.07 (ref 0.84–1.19)
LYMPHOCYTES # BLD AUTO: 1.58 THOUSANDS/ΜL (ref 0.6–4.47)
LYMPHOCYTES NFR BLD AUTO: 19 % (ref 14–44)
MCH RBC QN AUTO: 31.2 PG (ref 26.8–34.3)
MCHC RBC AUTO-ENTMCNC: 32.5 G/DL (ref 31.4–37.4)
MCV RBC AUTO: 96 FL (ref 82–98)
MONOCYTES # BLD AUTO: 0.94 THOUSAND/ΜL (ref 0.17–1.22)
MONOCYTES NFR BLD AUTO: 11 % (ref 4–12)
NEUTROPHILS # BLD AUTO: 5.89 THOUSANDS/ΜL (ref 1.85–7.62)
NEUTS SEG NFR BLD AUTO: 68 % (ref 43–75)
NRBC BLD AUTO-RTO: 0 /100 WBCS
P AXIS: 64 DEGREES
PLATELET # BLD AUTO: 157 THOUSANDS/UL (ref 149–390)
PMV BLD AUTO: 10 FL (ref 8.9–12.7)
POTASSIUM SERPL-SCNC: 3.5 MMOL/L (ref 3.5–5.3)
PR INTERVAL: 160 MS
PROT SERPL-MCNC: 7.2 G/DL (ref 6.4–8.2)
PROTHROMBIN TIME: 13.6 SECONDS (ref 11.6–14.5)
QRS AXIS: 12 DEGREES
QRSD INTERVAL: 140 MS
QT INTERVAL: 448 MS
QTC INTERVAL: 458 MS
RBC # BLD AUTO: 4.52 MILLION/UL (ref 3.88–5.62)
SODIUM SERPL-SCNC: 139 MMOL/L (ref 136–145)
T WAVE AXIS: 88 DEGREES
TROPONIN I SERPL-MCNC: <0.02 NG/ML
VENTRICULAR RATE: 63 BPM
WBC # BLD AUTO: 8.54 THOUSAND/UL (ref 4.31–10.16)

## 2019-10-29 PROCEDURE — 99285 EMERGENCY DEPT VISIT HI MDM: CPT | Performed by: PHYSICIAN ASSISTANT

## 2019-10-29 PROCEDURE — 85730 THROMBOPLASTIN TIME PARTIAL: CPT | Performed by: PHYSICIAN ASSISTANT

## 2019-10-29 PROCEDURE — 85610 PROTHROMBIN TIME: CPT | Performed by: PHYSICIAN ASSISTANT

## 2019-10-29 PROCEDURE — 93005 ELECTROCARDIOGRAM TRACING: CPT

## 2019-10-29 PROCEDURE — 36415 COLL VENOUS BLD VENIPUNCTURE: CPT | Performed by: PHYSICIAN ASSISTANT

## 2019-10-29 PROCEDURE — 71046 X-RAY EXAM CHEST 2 VIEWS: CPT

## 2019-10-29 PROCEDURE — 84484 ASSAY OF TROPONIN QUANT: CPT | Performed by: PHYSICIAN ASSISTANT

## 2019-10-29 PROCEDURE — 71275 CT ANGIOGRAPHY CHEST: CPT

## 2019-10-29 PROCEDURE — 85379 FIBRIN DEGRADATION QUANT: CPT | Performed by: PHYSICIAN ASSISTANT

## 2019-10-29 PROCEDURE — 80053 COMPREHEN METABOLIC PANEL: CPT | Performed by: PHYSICIAN ASSISTANT

## 2019-10-29 PROCEDURE — 93010 ELECTROCARDIOGRAM REPORT: CPT | Performed by: INTERNAL MEDICINE

## 2019-10-29 PROCEDURE — 85025 COMPLETE CBC W/AUTO DIFF WBC: CPT | Performed by: PHYSICIAN ASSISTANT

## 2019-10-29 PROCEDURE — 99285 EMERGENCY DEPT VISIT HI MDM: CPT

## 2019-10-29 RX ORDER — METHOCARBAMOL 500 MG/1
500 TABLET, FILM COATED ORAL ONCE
Status: COMPLETED | OUTPATIENT
Start: 2019-10-29 | End: 2019-10-29

## 2019-10-29 RX ORDER — TRAMADOL HYDROCHLORIDE 50 MG/1
50 TABLET ORAL ONCE
Status: COMPLETED | OUTPATIENT
Start: 2019-10-29 | End: 2019-10-29

## 2019-10-29 RX ORDER — METHOCARBAMOL 500 MG/1
500 TABLET, FILM COATED ORAL 3 TIMES DAILY
Qty: 15 TABLET | Refills: 0 | Status: SHIPPED | OUTPATIENT
Start: 2019-10-29

## 2019-10-29 RX ADMIN — IOHEXOL 85 ML: 350 INJECTION, SOLUTION INTRAVENOUS at 14:13

## 2019-10-29 RX ADMIN — METHOCARBAMOL 500 MG: 500 TABLET, FILM COATED ORAL at 14:22

## 2019-10-29 RX ADMIN — TRAMADOL HYDROCHLORIDE 50 MG: 50 TABLET, FILM COATED ORAL at 15:21

## 2019-10-29 NOTE — DISCHARGE INSTRUCTIONS
Rest, ice for next couple days  Continue advil for pain  Take robaxin 3 times a day  Follow up with family doctor for recheck if no improvement in 2-3 days

## 2019-10-29 NOTE — ED PROVIDER NOTES
History  Chief Complaint   Patient presents with    Chest Pain     pt presents to ER with c/o CP for the past 2-3 days increasingly getting worse  patient feels fatigued and states its worse upon movement      Patient is an 79 y/o M with h/o prostate and bone cancer, HTN, hyperlipidemia, CAD that presents to the ED with right sided chest pain for 3 days, but worsened last night  The pain radiates to back  He denies SOB, but states pain is worse with deep breaths and movement  He denies recent trauma  No cough or fevers  He took motrin for the pain which seemed to help  No leg pain or swelling, no h/o blood clots  History provided by:  Patient  Chest Pain   Pain location:  R chest  Pain quality: aching    Pain radiates to:  Upper back  Pain radiates to the back: yes    Pain severity:  Moderate  Onset quality:  Gradual  Duration:  3 days  Timing:  Constant  Progression:  Worsening  Chronicity:  New  Context: at rest    Relieved by: motrin  Worsened by:  Deep breathing and movement  Associated symptoms: back pain and fatigue    Associated symptoms: no abdominal pain, no altered mental status, no anxiety, no cough, no diaphoresis, no dizziness, no fever, no lower extremity edema, no nausea, no numbness, no palpitations, no shortness of breath, not vomiting and no weakness    Risk factors: coronary artery disease, high cholesterol, hypertension and male sex    Risk factors: no aortic disease, no immobilization, not obese, no prior DVT/PE, no smoking and no surgery        Prior to Admission Medications   Prescriptions Last Dose Informant Patient Reported? Taking?    Omega-3 Fatty Acids (FISH OIL PO)  Self Yes No   Sig: Take 2 g by mouth   abiraterone (ZYTIGA) 250 mg tablet  Self Yes No   Sig: Take 1,000 mg by mouth daily   atorvastatin (LIPITOR) 40 mg tablet  Self Yes No   Sig: Take 40 mg by mouth   clopidogrel (PLAVIX) 75 mg tablet  Self Yes No   Sig: every 24 hours   metoprolol succinate (TOPROL-XL) 25 mg 24 hr tablet  Self Yes No   Sig: every 24 hours   oxyCODONE-acetaminophen (PERCOCET) 5-325 mg per tablet   No No   Si-2 tabs every 4-6 hours as needed   predniSONE 5 mg tablet  Self Yes No   Sig: Take 5mg by mouth twice daily      Facility-Administered Medications: None       Past Medical History:   Diagnosis Date    CAD (coronary artery disease)     Cancer ([facility] [address] )     prostate cancer, bone cancer    Hyperlipidemia     Hypertension        Past Surgical History:   Procedure Laterality Date    CARDIAC SURGERY      CORONARY STENT PLACEMENT         Family History   Problem Relation Age of Onset    No Known Problems Mother     No Known Problems Father      I have reviewed and agree with the history as documented  Social History     Tobacco Use    Smoking status: Never Smoker    Smokeless tobacco: Never Used   Substance Use Topics    Alcohol use: Yes    Drug use: Not on file        Review of Systems   Constitutional: Positive for fatigue  Negative for chills, diaphoresis and fever  HENT: Negative  Respiratory: Negative for cough and shortness of breath  Cardiovascular: Positive for chest pain  Negative for palpitations and leg swelling  Gastrointestinal: Negative for abdominal pain, nausea and vomiting  Musculoskeletal: Positive for back pain  Negative for neck pain  Skin: Negative for color change and rash  Neurological: Negative for dizziness, speech difficulty, weakness, light-headedness and numbness  All other systems reviewed and are negative  Physical Exam  Physical Exam   Constitutional: He is oriented to person, place, and time  He appears well-developed and well-nourished  He is cooperative  He does not appear ill  No distress  HENT:   Head: Normocephalic and atraumatic     Right Ear: Hearing normal    Left Ear: Hearing normal    Nose: Nose normal    Mouth/Throat: Oropharynx is clear and moist and mucous membranes are normal    Eyes: Conjunctivae are normal    Neck: Normal range of motion  Cardiovascular: Normal rate, regular rhythm and normal heart sounds  No murmur heard  Pulmonary/Chest: Effort normal  He has rales in the right lower field and the left lower field  He exhibits tenderness (under right breast, palpation reproduces symptoms  )  He exhibits no deformity and no swelling  Abdominal: Soft  Normal appearance and bowel sounds are normal  There is no tenderness  Musculoskeletal: Normal range of motion  He exhibits no edema  Neurological: He is alert and oriented to person, place, and time  He has normal strength  No sensory deficit  Skin: Skin is warm and dry  No bruising and no rash noted  He is not diaphoretic  No erythema  No pallor  Nursing note and vitals reviewed        Vital Signs  ED Triage Vitals   Temperature Pulse Respirations Blood Pressure SpO2   10/29/19 1238 10/29/19 1238 10/29/19 1238 10/29/19 1300 10/29/19 1238   (!) 97 1 °F (36 2 °C) 61 19 147/69 97 %      Temp Source Heart Rate Source Patient Position - Orthostatic VS BP Location FiO2 (%)   10/29/19 1238 10/29/19 1238 10/29/19 1238 10/29/19 1238 --   Temporal Monitor Lying Left arm       Pain Score       10/29/19 1232       Worst Possible Pain           Vitals:    10/29/19 1430 10/29/19 1445 10/29/19 1500 10/29/19 1515   BP: 160/66 (!) 171/72 151/66 159/70   Pulse: 60 60 60 63   Patient Position - Orthostatic VS: Lying Lying Lying Lying         Visual Acuity      ED Medications  Medications   traMADol (ULTRAM) tablet 50 mg (has no administration in time range)   methocarbamol (ROBAXIN) tablet 500 mg (500 mg Oral Given 10/29/19 1422)   iohexol (OMNIPAQUE) 350 MG/ML injection (MULTI-DOSE) 85 mL (85 mL Intravenous Given 10/29/19 1413)       Diagnostic Studies  Results Reviewed     Procedure Component Value Units Date/Time    D-Dimer [[id number]]  (Abnormal) Collected:  10/29/19 1250    Lab Status:  Final result Specimen:  Blood from Arm, Right Updated:  10/29/19 1339     D-Dimer, Quant 1 73 ug/ml [facility]     Troponin I [[id number]]  (Normal) Collected:  10/29/19 1250    Lab Status:  Final result Specimen:  Blood from Arm, Right Updated:  10/29/19 1328     Troponin I <0 02 ng/mL     Comprehensive metabolic panel [[id number]]  (Abnormal) Collected:  10/29/19 1250    Lab Status:  Final result Specimen:  Blood from Arm, Right Updated:  10/29/19 1327     Sodium 139 mmol/L      Potassium 3 5 mmol/L      Chloride 104 mmol/L      CO2 30 mmol/L      ANION GAP 5 mmol/L      BUN 14 mg/dL      Creatinine 0 78 mg/dL      Glucose 105 mg/dL      Calcium 9 1 mg/dL      AST 32 U/L      ALT 15 U/L      Alkaline Phosphatase 206 U/L      Total Protein 7 2 g/dL      Albumin 3 0 g/dL      Total Bilirubin 1 00 mg/dL      eGFR 82 ml/min/1 73sq m     Narrative:       [place] guidelines for Chronic Kidney Disease (CKD):     Stage 1 with normal or high GFR (GFR > 90 mL/min/1 73 square meters)    Stage 2 Mild CKD (GFR = 60-89 mL/min/1 73 square meters)    Stage 3A Moderate CKD (GFR = 45-59 mL/min/1 73 square meters)    Stage 3B Moderate CKD (GFR = 30-44 mL/min/1 73 square meters)    Stage 4 Severe CKD (GFR = 15-29 mL/min/1 73 square meters)    Stage 5 End Stage CKD (GFR <15 mL/min/1 73 square meters)  Note: GFR calculation is accurate only with a steady state creatinine    Protime-INR [[id number]]  (Normal) Collected:  10/29/19 1250    Lab Status:  Final result Specimen:  Blood from Arm, Right Updated:  10/29/19 1315     Protime 13 6 seconds      INR 1 07    APTT [[id number]]  (Normal) Collected:  10/29/19 1250    Lab Status:  Final result Specimen:  Blood from Arm, Right Updated:  10/29/19 1315     PTT 28 seconds     CBC and differential [[id number]] Collected:  10/29/19 1250    Lab Status:  Final result Specimen:  Blood from Arm, Right Updated:  10/29/19 1302     WBC 8 54 Thousand/uL      RBC 4 52 Million/uL      Hemoglobin 14 1 g/dL      Hematocrit 43 4 %      MCV 96 fL      MCH 31 2 pg      MCHC 32 5 g/dL      RDW 12 8 %      MPV 10 0 fL      Platelets 389 Thousands/uL      nRBC 0 /100 WBCs      Neutrophils Relative 68 %      Immat GRANS % 1 %      Lymphocytes Relative 19 %      Monocytes Relative 11 %      Eosinophils Relative 1 %      Basophils Relative 0 %      Neutrophils Absolute 5 89 Thousands/µL      Immature Grans Absolute 0 04 Thousand/uL      Lymphocytes Absolute 1 58 Thousands/µL      Monocytes Absolute 0 94 Thousand/µL      Eosinophils Absolute 0 07 Thousand/µL      Basophils Absolute 0 02 Thousands/µL                  CTA ED chest PE study   Final Result by Annabella Jones MD (10/29 3867)      No acute pathology  No pulmonary embolism  Small sclerotic lesion in the left 10th rib suspicious for osteoblastic metastatic disease related to prostate cancer  Workstation performed: [id number]         XR chest 2 views   Final Result by Annabella Jones MD (10/29 7097)      No acute cardiopulmonary disease              Workstation performed: [id number]                    Procedures  ECG 12 Lead Documentation Only  Date/Time: 10/29/2019 2:00 PM  Performed by: Syed Camacho PA-C  Authorized by: Syed Camacho PA-C     Indications / Diagnosis:  Right chest pain  ECG reviewed by me, the ED Provider: yes    Patient location:  ED  Previous ECG:     Previous ECG:  Compared to current    Similarity:  No change  Rate:     ECG rate:  63  Rhythm:     Rhythm: sinus rhythm    Conduction:     Conduction: abnormal      Abnormal conduction: complete LBBB             ED Course         HEART Risk Score      Most Recent Value   History  0 Filed at: 10/29/2019 1333   ECG  1 Filed at: 10/29/2019 1333   Age  2 Filed at: 10/29/2019 1333   Risk Factors  2 Filed at: 10/29/2019 1333   Troponin  0 Filed at: 10/29/2019 1333   Heart Score Risk Calculator   History  0 Filed at: 10/29/2019 1333   ECG  1 Filed at: 10/29/2019 1333   Age  2 Filed at: 10/29/2019 1333   Risk Factors  2 Filed at: 10/29/2019 1333 Troponin  0 Filed at: 10/29/2019 1333   HEART Score  5 Filed at: 10/29/2019 1333   HEART Score  5 Filed at: 10/29/2019 1333                      Aleah Taylro Criteria for PE      Most Recent Value   Bandar' Criteria for PE   Clinical signs and symptoms of DVT  0 Filed at: 10/29/2019 1250   PE is primary diagnosis or equally likely  0 Filed at: 10/29/2019 1250   HR >100  0 Filed at: 10/29/2019 1250   Immobilization at least 3 days or Surgery in the previous 4 weeks  0 Filed at: 10/29/2019 1250   Previous, objectively diagnosed PE or DVT  0 Filed at: 10/29/2019 1250   Hemoptysis  0 Filed at: 10/29/2019 1250   Malignancy with treatment within 6 months or palliative  1 Filed at: 10/29/2019 1250   Bandar' Criteria Total  1 Filed at: 10/29/2019 1250            [facility]  Number of Diagnoses or Management Options  Right-sided chest wall pain: new and requires workup  Diagnosis management comments: Patient with right sided chest pain, will order labs, ekg, cxr to r/o cardiac or pulmonary disease  Patient has h/o cancer, will order CT chest to r/o PE  No abnormalities on scan or labs, most likely musculoskeletal, will prescribe muscle relaxant  Amount and/or Complexity of Data Reviewed  Clinical lab tests: ordered and reviewed  Tests in the radiology section of CPT®: ordered and reviewed    Patient Progress  Patient progress: stable      Disposition  Final diagnoses:   Right-sided chest wall pain     Time reflects when diagnosis was documented in both MDM as applicable and the Disposition within this note     Time User Action Codes Description Comment    10/29/2019  3:11 PM Kavita Cuff Add [R07 89] Right-sided chest wall pain       ED Disposition     ED Disposition Condition Date/Time Comment    Discharge Stable Tue Oct 29, 2019  3:11 PM Pérez Johnson discharge to home/self care              Follow-up Information     Follow up With Specialties Details Why [address] [facility] Call in 3 days For recheck [address]  [phone]            Patient's Medications   Discharge Prescriptions    METHOCARBAMOL (ROBAXIN) 500 MG TABLET    Take 1 tablet (500 mg total) by mouth 3 (three) times a day       Start Date: 10/29/2019End Date: --       Order Dose: 500 mg       Quantity: 15 tablet    Refills: 0     No discharge procedures on file      ED Provider  Electronically Signed by           Michelle Brennan PA-C  10/29/19 4475